# Patient Record
Sex: FEMALE
[De-identification: names, ages, dates, MRNs, and addresses within clinical notes are randomized per-mention and may not be internally consistent; named-entity substitution may affect disease eponyms.]

---

## 2021-08-12 ENCOUNTER — APPOINTMENT (OUTPATIENT)
Dept: GASTROENTEROLOGY | Facility: CLINIC | Age: 51
End: 2021-08-12
Payer: COMMERCIAL

## 2021-08-12 VITALS
HEART RATE: 70 BPM | HEIGHT: 63 IN | RESPIRATION RATE: 12 BRPM | DIASTOLIC BLOOD PRESSURE: 70 MMHG | BODY MASS INDEX: 34.55 KG/M2 | WEIGHT: 195 LBS | SYSTOLIC BLOOD PRESSURE: 126 MMHG

## 2021-08-12 DIAGNOSIS — E66.9 OBESITY, UNSPECIFIED: ICD-10-CM

## 2021-08-12 DIAGNOSIS — Z12.11 ENCOUNTER FOR SCREENING FOR MALIGNANT NEOPLASM OF COLON: ICD-10-CM

## 2021-08-12 DIAGNOSIS — K21.9 GASTRO-ESOPHAGEAL REFLUX DISEASE W/OUT ESOPHAGITIS: ICD-10-CM

## 2021-08-12 PROCEDURE — 99203 OFFICE O/P NEW LOW 30 MIN: CPT

## 2021-08-13 ENCOUNTER — RX CHANGE (OUTPATIENT)
Age: 51
End: 2021-08-13

## 2021-08-13 RX ORDER — SODIUM SULFATE, MAGNESIUM SULFATE, AND POTASSIUM CHLORIDE 17.75; 2.7; 2.25 G/1; G/1; G/1
1479-225-188 TABLET ORAL
Qty: 24 | Refills: 0 | Status: DISCONTINUED | COMMUNITY
Start: 2021-08-12 | End: 2021-08-13

## 2021-08-15 PROBLEM — K21.9 CHRONIC GERD: Status: RESOLVED | Noted: 2021-08-15 | Resolved: 2021-08-15

## 2021-08-15 RX ORDER — ESOMEPRAZOLE MAGNESIUM 40 MG/1
40 CAPSULE, DELAYED RELEASE ORAL
Qty: 90 | Refills: 1 | Status: DISCONTINUED | COMMUNITY
Start: 2021-08-15 | End: 2021-08-15

## 2021-08-15 NOTE — REASON FOR VISIT
Pt informed of message.   [Initial Evaluation] : an initial evaluation [FreeTextEntry1] : colon cancer screening

## 2021-08-15 NOTE — ASSESSMENT
[FreeTextEntry1] : 50 yo female, hx of obesity, referred for colon cancer screening. Average risk for colon cancer; she denies change in bowel habits or rectal bleeding.\par \par She was advised to undergo colonoscopy to which she  agreed. The procedure will be performed in [facility] with the assistance of an anesthesiologist. The procedure was explained in detail and she understood the risks of the procedure not limited  to infection, bleeding, perforation, or non-diagnosis of colon cancer. She  was advised that she could not drive home alone, if the patient chooses to receive sedation. Further diagnostic and treatment recommendations will be based upon the procedure and any biopsies, if they are taken.\par

## 2021-08-15 NOTE — HISTORY OF PRESENT ILLNESS
[de-identified] : 52 yo female [facility] referred for colon cancer screening. She denies change in bowel habits, rectal bleeding or family history of colon cancer.

## 2021-09-22 ENCOUNTER — LABORATORY RESULT (OUTPATIENT)
Age: 51
End: 2021-09-22

## 2021-09-27 ENCOUNTER — APPOINTMENT (OUTPATIENT)
Dept: GASTROENTEROLOGY | Facility: AMBULATORY SURGERY CENTER | Age: 51
End: 2021-09-27
Payer: COMMERCIAL

## 2021-09-27 PROCEDURE — 45380 COLONOSCOPY AND BIOPSY: CPT

## 2021-09-27 RX ORDER — SODIUM SULFATE, MAGNESIUM SULFATE, AND POTASSIUM CHLORIDE 17.75; 2.7; 2.25 G/1; G/1; G/1
1479-225-188 TABLET ORAL
Qty: 24 | Refills: 0 | Status: DISCONTINUED | COMMUNITY
Start: 2021-09-17 | End: 2021-09-27

## 2021-09-27 RX ORDER — SODIUM SULFATE, MAGNESIUM SULFATE, AND POTASSIUM CHLORIDE 17.75; 2.7; 2.25 G/1; G/1; G/1
1479-225-188 TABLET ORAL
Qty: 24 | Refills: 0 | Status: DISCONTINUED | COMMUNITY
Start: 2021-08-13 | End: 2021-09-27